# Patient Record
(demographics unavailable — no encounter records)

---

## 2017-10-20 DIAGNOSIS — Z12.31 ENCOUNTER FOR SCREENING MAMMOGRAM FOR MALIGNANT NEOPLASM OF BREAST: ICD-10-CM

## 2018-01-09 NOTE — RESULT NOTES
Message   Call Brandon Jones her bone density test shows osteopenia  This is best treated with 1200 mg of calcium a day + 800 to one thousand  units of vitamin D a day  Verified Results  * DXA BONE DENSITY SPINE HIP AND PELVIS [id number] 11:41AM Tomjefferson Diaz     Test Name Result Flag Reference   DXA BONE DENSITY SPINE HIP AND PELVIS (Report)     CENTRAL DXA SCAN     CLINICAL HISTORY:  79year old post-menopausal  female risk factors include history of degenerative arthritis and insulin-dependent diabetes  Diuretic use  TECHNIQUE: Bone densitometry was performed using a Hologic Horizon A  bone densitometer  Regions of interest appear properly placed  There are no obvious fractures or other confounding variables which could limit the study  Degenerative changes of the   lumbar spine and hip  This will falsely elevate the bone mineral densities in these regions  COMPARISON: August 24, 2010     RESULTS:    LUMBAR SPINE: L1-L4:   BMD 1 126 gm/cm2   T-score 0 7   Z-score 2 7     LEFT TOTAL HIP:   BMD 0 896 gm/cm2   T-score -0 4   Z-score 1 0     LEFT FEMORAL NECK:   BMD 0 742 gm/cm2   T-score -1 0   Z-score 0 7           ASSESSMENT:   1  Based on the WHO classification, the T-score of -1 0 in the left hip is consistent with low bone mineral density  2  Since the prior study, there has been an increase in the bone mineral density of the lumbar spine and a decrease in the bone mineral density of the left hip  The increase in bone mineral density seen in the lumbar spine is likely falsely elevated    due to progression of degenerative changes within this region  When follow-up densitometry is performed, it is recommended that bone mineral density of the forearm also be assessed, due to the advanced degenerative changes seen in the lumbar spine and    hip  Bone densitometry of the forearm is less susceptible to degenerative disc and degenerative joint disease   It should be noted however that the examinations were performed on dissimilar DXA units  3  FRAX not reported because all T-scores for spine total, hip total, femoral neck or forearm are at or above -1 0     4  Any secondary causes of low bone mineral density should be excluded prior to treatment, if clinically indicated  5  A daily intake of at least 1200 mg calcium and 800 to 1000 IU of Vitamin D, as well as weight bearing and muscle strengthening exercise, fall prevention and avoidance of tobacco and excessive alcohol intake as basic preventive measures are suggested  6  Repeat DXA scan in 18-24 months as clinically indicated             WHO CLASSIFICATION:   Normal (a T-score of -1 0 or higher)   Low bone mineral density (a T-score of less than -1 0 but higher than -2 5)   Osteoporosis (a T-score of -2 5 or less)   Severe osteoporosis (a T-score of -2 5 or less with a fragility fracture)             Workstation performed: [id number]

## 2018-01-12 NOTE — RESULT NOTES
Verified Results  * MAMMO SCREENING BILATERAL W CAD [id number] 10:57AM [facility]     Test Name Result Flag Reference   MAMMO SCREENING BILATERAL W CAD (Report)     Patient History:   Patient is postmenopausal    Family history of prostate cancer in father at age 61  Cyst aspiration of the left breast    Patient has never smoked  Patient's BMI is 32 1  Reason for exam: screening (asymptomatic)  Mammo Screening Bilateral W CAD: February 29, 2016 - Check In #:    [de-identified]   Bilateral MLO and CC view(s) were taken  Technologist: RALPH Brunner T (R)(M)   Prior study comparison: February 14, 2015, digital bilateral    screening mammogram performed at [address] [facility]  February 24, 2014, left breast unilateral diagnostic mammogram,    performed at [email or web address]  February 8, 2014, digital bilateral screening mammogram performed at [facility]  January 29, 2013, digital bilateral    screening mammogram performed at [address] [facility]  January 28, 2012, digital bilateral screening mammogram performed   at [address] [facility]  January 22, 2011, digital    b[facility]  There are scattered fibroglandular densities  No dominant soft tissue mass, architectural distortion or    suspicious calcifications are noted  The skin and nipple    structures are within normal limits  Scattered benign appearing    calcifications are noted  No mammographic evidence of malignancy  No    significant changes when compared with prior studies  ASSESSMENT: BiRad:2 - Benign     Recommendation:   Routine screening mammogram of both breasts in 1 year  A    reminder letter will be sent  Analyzed by CAD     8-10% of cancers will be missed on mammography  Management of a    palpable abnormality must be based on clinical grounds   Patients   will be notified of their results via letter from our facility  Accredited by [employer] of [employer] and FDA  Transcription Location: RALPH Luther 98: [id number]     Risk Value(s):   Tyrer-Cuzick 10 Year: 4 158%, Tyrer-Cuzick Lifetime: 7 917%,    Myriad Table: 1 5%, ARTI 5 Year: 1 9%, NCI Lifetime: 6 4%   Signed by:    Raisa Glass MD   2/29/16

## 2018-01-13 NOTE — RESULT NOTES
Verified Results  (1) THIN PREP PAP WITH IMAGING [id number] 02:52PM Melissa Garces     Test Name Result Flag Reference   LAB AP CASE REPORT (Report)     Gynecologic Cytology Report            Case: [id number]                  Authorizing Provider: Rupinder Gao MD     Collected:      10/18/2016 1452        First Screen:     EKTA Frye    Received:      10/20/2016 1452        Specimen:  LIQUID-BASED PAP, SCREENING, Cervix   LAB AP GYN PRIMARY INTERPRETATION      Negative for intraepithelial lesion or malignancy  Electronically signed by EKTA Frye on 10/25/2016 at 2:49 PM   LAB AP GYN SPECIMEN ADEQUACY      Satisfactory for evaluation  (See note)   LAB AP GYN NOTE      No endocervical cells identified  It is difficult to differentiate between   squamous metaplastic cells and parabasal cells in atrophic specimens due   to numerous causes including menopause, postpartum changes and   progestational agents  LAB AP GYN ADDITIONAL INFORMATION (Report)     [employer]'s FDA approved ,  and ThinPrep Imaging System are   utilized with strict adherence to the 's instruction manual to   prepare gynecologic and non-gynecologic cytology specimens for the   production of ThinPrep slides as well as for gynecologic ThinPrep imaging  These processes have been validated by our laboratory and/or by the     The Pap test is not a diagnostic procedure and should not be used as the   sole means to detect cervical cancer  It is only a screening procedure to   aid in the detection of cervical cancer and its precursors  Both   false-negative and false-positive results have been experienced  Your   patient's test result should be interpreted in this context together with   the history and clinical findings     LAB AP LMP